# Patient Record
Sex: MALE | Race: OTHER | NOT HISPANIC OR LATINO | URBAN - METROPOLITAN AREA
[De-identification: names, ages, dates, MRNs, and addresses within clinical notes are randomized per-mention and may not be internally consistent; named-entity substitution may affect disease eponyms.]

---

## 2018-07-26 ENCOUNTER — SEXUAL HEALTH (OUTPATIENT)
Dept: SURGERY | Facility: CLINIC | Age: 38
End: 2018-07-26

## 2018-07-26 DIAGNOSIS — Z11.3 SCREENING FOR STDS (SEXUALLY TRANSMITTED DISEASES): Primary | ICD-10-CM

## 2018-07-26 NOTE — PROGRESS NOTES
Juan Carlos Venegas        CHIEF CONCERN(S)    No LMP for male patient  Birth Control Method condoms    Condom Used: Occasionally    Sexual Preference :  Female    Date of Last Sexual Exposure: 7/13/18    # of Partners: Last 30 days 1, Last 90 days 1 and Last Year 2    Sexual Practices: Oral, Vaginal and Anal     1   CURRENT RISK BEHAVIOR(S)  Condoms sometimes  Sexual partners of unknown sexual history  Was notified by phone from a clinic that he needs to be tested, did not specify which std            2  SAFER GOAL BEHAVIOR(S)  > PREVIOUS SUCCESSES  Uses condoms  Std/hiv tested previously              >  SAFER GOAL BEHAVIOR(S)  Abstinence  Use condoms more regularly  Get std/hiv testing regularly              3  PERSON ACTION PLAN:  > BARRIERS:none  > BENEFITS: less risk for mane std/hiv  Less anxiety regarding risk for std/hib            > ACTION STEPS:  Std/hiv testing today  Consent given            4  REFERRALS:

## 2018-07-26 NOTE — ASSESSMENT & PLAN NOTE
Counseling was given to pt to wear condoms during sexual relations  Screening for STD (sexually transmitted disease):    Urine to be sent for GC/CT    Blood to be sent for HIV/RPR    Recommended safe sex practices: wear condom at all time including oral sex  Follow up in the clinic in 1 week

## 2018-07-26 NOTE — PATIENT INSTRUCTIONS
Practice safe sex using a condom for each sexually encounter  Condoms offer the best protection against STD's by acting as a physical barrier to prevent the exchange of semen, vaginal fluids, and blood between partners  Condoms on not a 100% effective in protection  Reducing the number of sexual partners can also help to reduce the risk of the transmission of STD's along with regular STD screening  Urine collected for GC/CT testing  Blood collected for HIV/syphilis testing  Recommend safer sex practices including 100% condom use  Recommend regular STD testing  Follow up 1 week for results

## 2018-07-26 NOTE — PROGRESS NOTES
Assessment/Plan:    Screening for STD (sexually transmitted disease)  Counseling was given to pt to wear condoms during sexual relations  Screening for STD (sexually transmitted disease):    Urine to be sent for GC/CT    Blood to be sent for HIV/RPR    Recommended safe sex practices: wear condom at all time including oral sex  Follow up in the clinic in 1 week  Diagnoses and all orders for this visit:    Screening for STDs (sexually transmitted diseases)      CHIEF CONCERN(S)    No LMP for male patient  Birth Control Method condoms    Condom Used: Occasionally    Sexual Preference :  Female    Date of Last Sexual Exposure: 7/13/18    # of Partners: Last 30 days 1, Last 90 days 1 and Last Year 2    Sexual Practices: Oral, Vaginal and Anal     1  CURRENT RISK BEHAVIOR(S)  Condoms sometimes  Sexual partners of unknown sexual history  Was notified by phone from a clinic that he needs to be tested, did not specify which std            2  SAFER GOAL BEHAVIOR(S)  > PREVIOUS SUCCESSES  Uses condoms  Std/hiv tested previously              >  SAFER GOAL BEHAVIOR(S)  Abstinence  Use condoms more regularly  Get std/hiv testing regularly              3  PERSON ACTION PLAN:  > BARRIERS:none  > BENEFITS: less risk for mane std/hiv  Less anxiety regarding risk for std/hib            > ACTION STEPS:  Std/hiv testing today  Consent given            4  REFERRALS:    Subjective:      Patient ID: Xavier Junior is a 41 y o  male  Pt was sent to the clinic because he received a generic voice mail asking him to go to an STD clinic to be tested  Pt reports having 2 partner female and without protected sex  No anal sex but oral and vaginal   Pt denies any sores, or blisters on penis, no bumps or lumps in groin area  Pt did use condoms on and off with both partners  Pt did have sexual relations with both partners separate at the same time but not together  Pt has noticed a tingling and an occassional itch  Tingling is new over the past 2 to 4 weeks but pt denies any discharge and pt does check regular for discharge  Pt denies any discharge in his underwear  Review of STD testing was done and pt have verbal consent  The following portions of the patient's history were reviewed and updated as appropriate: allergies and past social history  Review of Systems   Constitutional: Negative  Respiratory: Negative  Gastrointestinal: Negative  Genitourinary: Negative  Musculoskeletal: Negative  Objective: There were no vitals taken for this visit  Physical Exam   Constitutional: He is oriented to person, place, and time  He appears well-developed and well-nourished  Genitourinary: Penis normal  No penile tenderness  Genitourinary Comments: lymph nodes were negative   Musculoskeletal: Normal range of motion  Neurological: He is oriented to person, place, and time  Skin: Skin is warm and dry  Psychiatric: He has a normal mood and affect  His behavior is normal    Nursing note reviewed